# Patient Record
Sex: FEMALE | Race: WHITE | NOT HISPANIC OR LATINO
[De-identification: names, ages, dates, MRNs, and addresses within clinical notes are randomized per-mention and may not be internally consistent; named-entity substitution may affect disease eponyms.]

---

## 2017-01-03 ENCOUNTER — APPOINTMENT (OUTPATIENT)
Dept: CARDIOLOGY | Facility: CLINIC | Age: 62
End: 2017-01-03

## 2017-01-03 ENCOUNTER — NON-APPOINTMENT (OUTPATIENT)
Age: 62
End: 2017-01-03

## 2017-01-03 VITALS
OXYGEN SATURATION: 100 % | DIASTOLIC BLOOD PRESSURE: 69 MMHG | SYSTOLIC BLOOD PRESSURE: 109 MMHG | HEART RATE: 58 BPM | BODY MASS INDEX: 22.32 KG/M2 | WEIGHT: 126 LBS

## 2017-02-02 ENCOUNTER — APPOINTMENT (OUTPATIENT)
Dept: VASCULAR SURGERY | Facility: CLINIC | Age: 62
End: 2017-02-02

## 2017-02-02 VITALS — SYSTOLIC BLOOD PRESSURE: 131 MMHG | HEART RATE: 64 BPM | DIASTOLIC BLOOD PRESSURE: 81 MMHG

## 2017-02-02 VITALS — TEMPERATURE: 98.3 F | RESPIRATION RATE: 16 BRPM

## 2017-02-02 VITALS — HEIGHT: 63 IN | BODY MASS INDEX: 22.32 KG/M2 | WEIGHT: 126 LBS

## 2017-02-02 DIAGNOSIS — I72.4 ANEURYSM OF ARTERY OF LOWER EXTREMITY: ICD-10-CM

## 2017-03-18 ENCOUNTER — RX RENEWAL (OUTPATIENT)
Age: 62
End: 2017-03-18

## 2017-03-24 ENCOUNTER — APPOINTMENT (OUTPATIENT)
Dept: MRI IMAGING | Facility: CLINIC | Age: 62
End: 2017-03-24

## 2017-04-10 ENCOUNTER — MEDICATION RENEWAL (OUTPATIENT)
Age: 62
End: 2017-04-10

## 2017-05-24 ENCOUNTER — RX RENEWAL (OUTPATIENT)
Age: 62
End: 2017-05-24

## 2017-05-31 ENCOUNTER — MEDICATION RENEWAL (OUTPATIENT)
Age: 62
End: 2017-05-31

## 2017-06-30 ENCOUNTER — MEDICATION RENEWAL (OUTPATIENT)
Age: 62
End: 2017-06-30

## 2017-07-03 ENCOUNTER — RX RENEWAL (OUTPATIENT)
Age: 62
End: 2017-07-03

## 2017-07-12 ENCOUNTER — MEDICATION RENEWAL (OUTPATIENT)
Age: 62
End: 2017-07-12
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
## 2017-07-17 ENCOUNTER — APPOINTMENT (OUTPATIENT)
Dept: CARDIOLOGY | Facility: CLINIC | Age: 62
End: 2017-07-17

## 2017-07-17 ENCOUNTER — NON-APPOINTMENT (OUTPATIENT)
Age: 62
End: 2017-07-17

## 2017-07-17 ENCOUNTER — APPOINTMENT (OUTPATIENT)
Dept: CARDIOLOGY | Facility: CLINIC | Age: 62
End: 2017-07-17
Payer: COMMERCIAL

## 2017-07-17 VITALS — OXYGEN SATURATION: 98 % | HEART RATE: 61 BPM | SYSTOLIC BLOOD PRESSURE: 122 MMHG | DIASTOLIC BLOOD PRESSURE: 70 MMHG

## 2017-07-17 VITALS — WEIGHT: 129 LBS | HEIGHT: 63 IN | BODY MASS INDEX: 22.86 KG/M2
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
## 2017-07-17 PROCEDURE — 93000 ELECTROCARDIOGRAM COMPLETE: CPT

## 2017-07-17 PROCEDURE — 99214 OFFICE O/P EST MOD 30 MIN: CPT

## 2017-08-30 ENCOUNTER — OTHER (OUTPATIENT)
Age: 62
End: 2017-08-30

## 2017-09-20 ENCOUNTER — NON-APPOINTMENT (OUTPATIENT)
Age: 62
End: 2017-09-20

## 2017-09-20 ENCOUNTER — APPOINTMENT (OUTPATIENT)
Dept: CARDIOLOGY | Facility: CLINIC | Age: 62
End: 2017-09-20
Payer: COMMERCIAL

## 2017-09-20 VITALS
BODY MASS INDEX: 23.21 KG/M2 | DIASTOLIC BLOOD PRESSURE: 79 MMHG | OXYGEN SATURATION: 96 % | WEIGHT: 131 LBS | HEART RATE: 63 BPM | SYSTOLIC BLOOD PRESSURE: 124 MMHG

## 2017-09-20 DIAGNOSIS — I34.0 NONRHEUMATIC MITRAL (VALVE) INSUFFICIENCY: ICD-10-CM

## 2017-09-20 DIAGNOSIS — I10 ESSENTIAL (PRIMARY) HYPERTENSION: ICD-10-CM

## 2017-09-20 DIAGNOSIS — I35.1 NONRHEUMATIC AORTIC (VALVE) INSUFFICIENCY: ICD-10-CM

## 2017-09-20 DIAGNOSIS — R06.09 OTHER FORMS OF DYSPNEA: ICD-10-CM

## 2017-09-20 PROCEDURE — 99215 OFFICE O/P EST HI 40 MIN: CPT

## 2017-09-20 PROCEDURE — 93000 ELECTROCARDIOGRAM COMPLETE: CPT

## 2018-07-09 ENCOUNTER — TRANSCRIPTION ENCOUNTER (OUTPATIENT)
Age: 63
End: 2018-07-09

## 2018-08-05 ENCOUNTER — TRANSCRIPTION ENCOUNTER (OUTPATIENT)
Age: 63
End: 2018-08-05

## 2018-08-07 ENCOUNTER — RX RENEWAL (OUTPATIENT)
Age: 63
End: 2018-08-07

## 2018-11-08 ENCOUNTER — RX RENEWAL (OUTPATIENT)
Age: 63
End: 2018-11-08

## 2020-07-19 ENCOUNTER — TRANSCRIPTION ENCOUNTER (OUTPATIENT)
Age: 65
End: 2020-07-19

## 2020-08-06 ENCOUNTER — TRANSCRIPTION ENCOUNTER (OUTPATIENT)
Age: 65
End: 2020-08-06

## 2023-05-27 ENCOUNTER — NON-APPOINTMENT (OUTPATIENT)
Age: 68
End: 2023-05-27

## 2023-07-11 ENCOUNTER — NON-APPOINTMENT (OUTPATIENT)
Age: 68
End: 2023-07-11

## 2023-07-17 DIAGNOSIS — D17.21 BENIGN LIPOMATOUS NEOPLASM OF SKIN AND SUBCUTANEOUS TISSUE OF RIGHT ARM: ICD-10-CM

## 2023-07-17 DIAGNOSIS — T14.8XXA OTHER INJURY OF UNSPECIFIED BODY REGION, INITIAL ENCOUNTER: ICD-10-CM

## 2023-07-17 DIAGNOSIS — I67.1 CEREBRAL ANEURYSM, NONRUPTURED: ICD-10-CM

## 2023-07-17 DIAGNOSIS — Z86.59 PERSONAL HISTORY OF OTHER MENTAL AND BEHAVIORAL DISORDERS: ICD-10-CM

## 2023-07-18 ENCOUNTER — APPOINTMENT (OUTPATIENT)
Dept: VASCULAR SURGERY | Facility: CLINIC | Age: 68
End: 2023-07-18
Payer: MEDICARE

## 2023-07-18 VITALS
HEIGHT: 63 IN | SYSTOLIC BLOOD PRESSURE: 122 MMHG | BODY MASS INDEX: 24.88 KG/M2 | DIASTOLIC BLOOD PRESSURE: 70 MMHG | WEIGHT: 140.38 LBS

## 2023-07-18 DIAGNOSIS — J45.909 UNSPECIFIED ASTHMA, UNCOMPLICATED: ICD-10-CM

## 2023-07-18 DIAGNOSIS — Z98.890 OTHER SPECIFIED POSTPROCEDURAL STATES: ICD-10-CM

## 2023-07-18 DIAGNOSIS — I72.9 ANEURYSM OF UNSPECIFIED SITE: ICD-10-CM

## 2023-07-18 DIAGNOSIS — Z78.9 OTHER SPECIFIED HEALTH STATUS: ICD-10-CM

## 2023-07-18 DIAGNOSIS — Z87.2 PERSONAL HISTORY OF DISEASES OF THE SKIN AND SUBCUTANEOUS TISSUE: ICD-10-CM

## 2023-07-18 DIAGNOSIS — Z87.898 PERSONAL HISTORY OF OTHER SPECIFIED CONDITIONS: ICD-10-CM

## 2023-07-18 DIAGNOSIS — Z85.89 PERSONAL HISTORY OF MALIGNANT NEOPLASM OF OTHER ORGANS AND SYSTEMS: ICD-10-CM

## 2023-07-18 DIAGNOSIS — Z81.8 FAMILY HISTORY OF OTHER MENTAL AND BEHAVIORAL DISORDERS: ICD-10-CM

## 2023-07-18 DIAGNOSIS — Z80.8 FAMILY HISTORY OF MALIGNANT NEOPLASM OF OTHER ORGANS OR SYSTEMS: ICD-10-CM

## 2023-07-18 DIAGNOSIS — Z83.511 FAMILY HISTORY OF GLAUCOMA: ICD-10-CM

## 2023-07-18 DIAGNOSIS — Z80.42 FAMILY HISTORY OF MALIGNANT NEOPLASM OF PROSTATE: ICD-10-CM

## 2023-07-18 DIAGNOSIS — Z85.828 PERSONAL HISTORY OF OTHER MALIGNANT NEOPLASM OF SKIN: ICD-10-CM

## 2023-07-18 DIAGNOSIS — D17.30 BENIGN LIPOMATOUS NEOPLASM OF SKIN AND SUBCUTANEOUS TISSUE OF UNSPECIFIED SITES: ICD-10-CM

## 2023-07-18 DIAGNOSIS — Z82.49 FAMILY HISTORY OF ISCHEMIC HEART DISEASE AND OTHER DISEASES OF THE CIRCULATORY SYSTEM: ICD-10-CM

## 2023-07-18 DIAGNOSIS — Z81.3 FAMILY HISTORY OF OTHER PSYCHOACTIVE SUBSTANCE ABUSE AND DEPENDENCE: ICD-10-CM

## 2023-07-18 PROCEDURE — 99203 OFFICE O/P NEW LOW 30 MIN: CPT

## 2023-07-18 RX ORDER — ALCLOMETASONE DIPROPIONATE 0.5 MG/G
0.05 OINTMENT TOPICAL
Qty: 15 | Refills: 0 | Status: ACTIVE | COMMUNITY
Start: 2017-07-26

## 2023-07-18 RX ORDER — SERTRALINE HYDROCHLORIDE 50 MG/1
50 TABLET, FILM COATED ORAL DAILY
Refills: 0 | Status: ACTIVE | COMMUNITY
Start: 2017-07-03

## 2023-07-18 RX ORDER — TRAZODONE HYDROCHLORIDE 50 MG/1
50 TABLET ORAL
Refills: 0 | Status: ACTIVE | COMMUNITY

## 2023-07-18 RX ORDER — DOXYCYCLINE HYCLATE 100 MG/1
100 CAPSULE ORAL
Qty: 42 | Refills: 0 | Status: COMPLETED | COMMUNITY
Start: 2017-07-12 | End: 2023-07-18

## 2023-07-18 RX ORDER — LITHIUM CARBONATE 300 MG/1
TABLET ORAL
Refills: 0 | Status: ACTIVE | COMMUNITY

## 2023-07-18 RX ORDER — BUDESONIDE AND FORMOTEROL FUMARATE DIHYDRATE 160; 4.5 UG/1; UG/1
AEROSOL RESPIRATORY (INHALATION)
Refills: 0 | Status: ACTIVE | COMMUNITY

## 2023-07-18 RX ORDER — TRAZODONE HYDROCHLORIDE 50 MG/1
50 TABLET ORAL
Qty: 90 | Refills: 0 | Status: COMPLETED | COMMUNITY
Start: 2017-06-08 | End: 2023-07-18

## 2023-07-18 RX ORDER — METOPROLOL TARTRATE 75 MG/1
TABLET, FILM COATED ORAL
Refills: 0 | Status: ACTIVE | COMMUNITY

## 2023-07-18 RX ORDER — HALOBETASOL PROPIONATE 0.5 MG/G
0.05 OINTMENT TOPICAL
Qty: 15 | Refills: 0 | Status: COMPLETED | COMMUNITY
Start: 2017-07-26 | End: 2023-07-18

## 2023-07-18 RX ORDER — TRAZODONE HYDROCHLORIDE 150 MG/1
150 TABLET ORAL
Qty: 30 | Refills: 0 | Status: COMPLETED | COMMUNITY
Start: 2017-03-18 | End: 2023-07-18

## 2023-07-18 RX ORDER — PANTOPRAZOLE 40 MG/1
40 TABLET, DELAYED RELEASE ORAL
Qty: 90 | Refills: 0 | Status: COMPLETED | COMMUNITY
Start: 2017-06-27 | End: 2023-07-18

## 2023-07-18 RX ORDER — FLUTICASONE PROPIONATE 50 UG/1
50 SPRAY, METERED NASAL
Qty: 16 | Refills: 0 | Status: ACTIVE | COMMUNITY
Start: 2017-02-09

## 2023-07-18 RX ORDER — GABAPENTIN 100 MG/1
100 CAPSULE ORAL
Qty: 90 | Refills: 0 | Status: COMPLETED | COMMUNITY
Start: 2017-07-06 | End: 2023-07-18

## 2023-07-18 NOTE — PHYSICAL EXAM
[Normal Breath Sounds] : Normal breath sounds [Normal Heart Sounds] : normal heart sounds [de-identified] : NAD [de-identified] : ADELA [de-identified] : flat, soft, mobile 1 cm mass on the R forearm, feels slightly deeper than the subcutaneous tissue, possibly within the fascia of the arm.  Nontender. No pores visible.

## 2023-07-18 NOTE — REVIEW OF SYSTEMS
[Negative] : Heme/Lymph [de-identified] : on aspirin but denies prolonged bleeding or easy bruising.

## 2023-07-18 NOTE — HISTORY OF PRESENT ILLNESS
[de-identified] : 66 yo female with history of brain aneurysm, presents for evaluation of a lump on her right arm.  Two weeks ago she bruised her arm in that location and developed a substantial amount of eccymosis on the forearm.  Denies blood thinners, only aspirin.  The eccymosis has mostly resolved but she noticed a flat mass under the skin of the forearm and she presents for evaluation of it.  \par \par She denies pain, parasthesias and she is certain it was not present prior to the injury

## 2023-07-18 NOTE — ASSESSMENT
[FreeTextEntry1] : Most likely a small lipoma on the forearm, could be a cyst.  Asymptomatic.  Recommended observation unless it becomes bothersome.

## 2023-07-24 ENCOUNTER — APPOINTMENT (OUTPATIENT)
Dept: ORTHOPEDIC SURGERY | Facility: CLINIC | Age: 68
End: 2023-07-24
Payer: MEDICARE

## 2023-07-24 VITALS — WEIGHT: 140.38 LBS | BODY MASS INDEX: 24.88 KG/M2 | HEIGHT: 63 IN

## 2023-07-24 PROCEDURE — 99204 OFFICE O/P NEW MOD 45 MIN: CPT

## 2023-07-24 PROCEDURE — 99214 OFFICE O/P EST MOD 30 MIN: CPT

## 2023-07-24 NOTE — HISTORY OF PRESENT ILLNESS
[Neck] : neck [Gradual] : gradual [3] : 3 [5] : 5 [Dull/Aching] : dull/aching [Throbbing] : throbbing [Tingling] : tingling [Constant] : constant [Retired] : Work status: retired [de-identified] : Patient presents today with neck pain for years with NKI. Previously completed PT and had ESIs. States her pain radiates down the left arm, has tingling. States she has cubital tunnel. States her neck feels stiff. States she has extreme balance issues, has passed out twice and has fallen twice this year. Admits taking Extra Strength Tylenol PRN for pain. States she had a cervical spine MRI at [facility] in [place], has no disc or report.  [] : no [FreeTextEntry7] : down the left arm [FreeTextEntry9] : not lying on her left side [de-identified] : lying on her left side, reaching forward and cross body, lifting [de-identified] : cervical spine MRI at [facility] in [place]

## 2023-07-24 NOTE — ASSESSMENT
[FreeTextEntry1] : Patient had MRI done, will obtain report and give to us.  Will check if she has CD with images \par \par Patient given prescription for EMG/NCS, follow up after study is completed to discuss results. \par \par Patient will begin physical therapy. \par \par Recommend: - NSAID - Heating pad - Muscle relaxer - Neck stretching exercise - Soft cervical collar - Cervical traction Patient is given neck rehabilitation exercise book.

## 2023-08-15 ENCOUNTER — APPOINTMENT (OUTPATIENT)
Dept: NEUROLOGY | Facility: CLINIC | Age: 68
End: 2023-08-15

## 2023-08-16 ENCOUNTER — RESULT REVIEW (OUTPATIENT)
Age: 68
End: 2023-08-16

## 2023-08-23 ENCOUNTER — TRANSCRIPTION ENCOUNTER (OUTPATIENT)
Age: 68
End: 2023-08-23

## 2023-08-31 ENCOUNTER — APPOINTMENT (OUTPATIENT)
Dept: ORTHOPEDIC SURGERY | Facility: CLINIC | Age: 68
End: 2023-08-31
Payer: MEDICARE

## 2023-08-31 VITALS — HEIGHT: 63 IN | WEIGHT: 140.38 LBS | BODY MASS INDEX: 24.88 KG/M2

## 2023-08-31 DIAGNOSIS — M48.02 SPINAL STENOSIS, CERVICAL REGION: ICD-10-CM

## 2023-08-31 DIAGNOSIS — M62.838 OTHER MUSCLE SPASM: ICD-10-CM

## 2023-08-31 DIAGNOSIS — M50.220 OTHER CERVICAL DISC DISPLACEMENT, MID-CERVICAL REGION, UNSPECIFIED LEVEL: ICD-10-CM

## 2023-08-31 DIAGNOSIS — M47.812 SPONDYLOSIS W/OUT MYELOPATHY OR RADICULOPATHY, CERVICAL REGION: ICD-10-CM

## 2023-08-31 DIAGNOSIS — M47.22 OTHER SPONDYLOSIS WITH RADICULOPATHY, CERVICAL REGION: ICD-10-CM

## 2023-08-31 DIAGNOSIS — M54.12 RADICULOPATHY, CERVICAL REGION: ICD-10-CM

## 2023-08-31 PROCEDURE — 99214 OFFICE O/P EST MOD 30 MIN: CPT

## 2023-08-31 RX ORDER — METHYLPREDNISOLONE 4 MG/1
4 TABLET ORAL
Qty: 1 | Refills: 0 | Status: ACTIVE | COMMUNITY
Start: 2023-08-31 | End: 1900-01-01

## 2023-08-31 NOTE — DATA REVIEWED
[EMG Nerve Conduction] : A EMG Nerve Conduction test was completed of the [Positive] : positive [Consistent with peripheral neuropathy] : consistent with peripheral neuropathy [MRI] : MRI [Cervical Spine] : cervical spine [Report was reviewed and noted in the chart] : The report was reviewed and noted in the chart [I independently reviewed and interpreted images and report] : I independently reviewed and interpreted images and report [FreeTextEntry1] : MRI: DDD C5-7 HNP C5-7  Severe stenosis C5-6 Moderate stenosis C6-7  EMG: Right ulnar sensory neuropathy

## 2023-09-04 NOTE — HISTORY OF PRESENT ILLNESS
[Neck] : neck [Gradual] : gradual [3] : 3 [5] : 5 [Dull/Aching] : dull/aching [Throbbing] : throbbing [Tingling] : tingling [Constant] : constant [Retired] : Work status: retired [] : no [FreeTextEntry7] : down the left arm [FreeTextEntry9] : not lying on her left side [de-identified] : lying on her left side, reaching forward and cross body, lifting [de-identified] : cervical spine MRI at [facility] in [place] [de-identified] : Follow up cervical spine MRI Results at [facility] and EMG Results. PT? Admits taking Extra Strength Tylenol PRN for pain.   Today's Pain

## 2023-09-04 NOTE — HISTORY OF PRESENT ILLNESS
[Neck] : neck [Gradual] : gradual [3] : 3 [5] : 5 [Throbbing] : throbbing [Dull/Aching] : dull/aching [Tingling] : tingling [Constant] : constant [Retired] : Work status: retired [] : no [FreeTextEntry7] : down the left arm [FreeTextEntry9] : not lying on her left side [de-identified] : lying on her left side, reaching forward and cross body, lifting [de-identified] : cervical spine MRI at [facility] in [place] [de-identified] : Follow up cervical spine MRI Results at [facility] and EMG Results. PT? Admits taking Extra Strength Tylenol PRN for pain.   Today's Pain

## 2023-09-15 ENCOUNTER — NON-APPOINTMENT (OUTPATIENT)
Age: 68
End: 2023-09-15

## 2023-09-21 ENCOUNTER — APPOINTMENT (OUTPATIENT)
Dept: ORTHOPEDIC SURGERY | Facility: CLINIC | Age: 68
End: 2023-09-21

## 2023-09-22 ENCOUNTER — APPOINTMENT (OUTPATIENT)
Dept: ORTHOPEDIC SURGERY | Facility: CLINIC | Age: 68
End: 2023-09-22
Payer: MEDICARE

## 2023-09-22 DIAGNOSIS — G57.62 LESION OF PLANTAR NERVE, LEFT LOWER LIMB: ICD-10-CM

## 2023-09-22 DIAGNOSIS — S90.32XA CONTUSION OF LEFT FOOT, INITIAL ENCOUNTER: ICD-10-CM

## 2023-09-22 PROCEDURE — 99213 OFFICE O/P EST LOW 20 MIN: CPT

## 2023-09-22 PROCEDURE — L4361: CPT | Mod: KX,LT

## 2023-09-22 PROCEDURE — 99203 OFFICE O/P NEW LOW 30 MIN: CPT

## 2023-09-27 ENCOUNTER — RESULT REVIEW (OUTPATIENT)
Age: 68
End: 2023-09-27

## 2023-09-29 ENCOUNTER — APPOINTMENT (OUTPATIENT)
Dept: ORTHOPEDIC SURGERY | Facility: CLINIC | Age: 68
End: 2023-09-29
Payer: MEDICARE

## 2023-09-29 DIAGNOSIS — M84.376A STRESS FRACTURE, UNSPECIFIED FOOT, INITIAL ENCOUNTER FOR FRACTURE: ICD-10-CM

## 2023-09-29 DIAGNOSIS — M84.375G STRESS FRACTURE, LEFT FOOT, SUBSEQUENT ENCOUNTER FOR FRACTURE WITH DELAYED HEALING: ICD-10-CM

## 2023-09-29 PROCEDURE — 99214 OFFICE O/P EST MOD 30 MIN: CPT

## 2023-10-09 ENCOUNTER — APPOINTMENT (OUTPATIENT)
Dept: ORTHOPEDIC SURGERY | Facility: CLINIC | Age: 68
End: 2023-10-09

## 2024-06-20 ENCOUNTER — APPOINTMENT (OUTPATIENT)
Dept: ORTHOPEDIC SURGERY | Facility: CLINIC | Age: 69
End: 2024-06-20
Payer: MEDICARE

## 2024-06-20 VITALS — HEIGHT: 63 IN | BODY MASS INDEX: 23.57 KG/M2 | WEIGHT: 133 LBS

## 2024-06-20 DIAGNOSIS — M25.811 OTHER SPECIFIED JOINT DISORDERS, RIGHT SHOULDER: ICD-10-CM

## 2024-06-20 PROCEDURE — 99214 OFFICE O/P EST MOD 30 MIN: CPT

## 2024-06-20 PROCEDURE — 73060 X-RAY EXAM OF HUMERUS: CPT | Mod: RT

## 2024-06-20 NOTE — PHYSICAL EXAM
[de-identified] : Constitutional: The patient appears well developed, well nourished. Examination of patients ability to communicate functionally was normal.       Neurologic: Coordination is normal. Alert and oriented to time, place and person. No evidence of mood disorder, calm affect.           RIGHT  SHOULDER: Inspection of the shoulder/upper arm is as follows: no swelling, no erythema, no ecchymosis, no atrophy and no deformity.       Palpation of the shoulder/upper arm is as follows: Tenderness is noted at the anterior shoulder and lateral shoulder and bicipital groove       Range of motion of the shoulder is as follows in degrees:   Pain with internal rotation, external rotation, abduction, and forward flexion.       active forward flexion to: 170    active abduction to: 170     internal rotation to: T10    external rotation with arm to side: 50      Strength of the shoulder is as follows:       Forward flexion 5/5   Abduction 5/5   External Rotation 5/5     Internal Rotation 5/5       Ligament Stability and Special Tests of the shoulder is as follows: Impingement testing is positive. Hawkin's testing is positive. There is positive arc of pain. Shoulder apprehension shows to be negative. Shoulder relocation is negative. Drop-arm testing is negative.       Neurological testing of the shoulder is as follows: reflexes intact, No sensory deficits, motor and sensor intact distally and no scapular winging.     [Right] : right shoulder [There are no fractures, subluxations or dislocations. No significant abnormalities are seen] : There are no fractures, subluxations or dislocations. No significant abnormalities are seen

## 2024-06-20 NOTE — HISTORY OF PRESENT ILLNESS
[de-identified] : Patient is here today for the right humerus and c/o pain between the shoulder and elbow pain for the past few mos.  She denies any injury. Pain is into the shoulder radiating to the upper arm.  She notes pain with overhead motion and at night. Denies any RUE paresthesias.  She likes to avoid NSAIDS due to her Lithium usage. She has tried tyl

## 2024-06-20 NOTE — DISCUSSION/SUMMARY
[de-identified] : Extensive discussion of the options was had with the patient. This discussion included both surgical and nonsurgical options. Options including but not limited to cortisone injection, visco-supplementation, physical therapy, oral anti-inflammatories were discussed with the patient. We also discussed the option of observation, allowing the patient to continue rest, ice, prescription drug NSAIDs and following up if the condition does not improve. Time was taken to go over any questions the patient had in regard to any of the treatment plans described. Plan is for CSI Right shoulder subacromial and PT. She will  f/u in 1 mos.

## 2024-07-29 ENCOUNTER — APPOINTMENT (OUTPATIENT)
Dept: ORTHOPEDIC SURGERY | Facility: CLINIC | Age: 69
End: 2024-07-29
Payer: MEDICARE

## 2024-07-29 DIAGNOSIS — M47.22 OTHER SPONDYLOSIS WITH RADICULOPATHY, CERVICAL REGION: ICD-10-CM

## 2024-07-29 DIAGNOSIS — M62.838 OTHER MUSCLE SPASM: ICD-10-CM

## 2024-07-29 DIAGNOSIS — M54.12 RADICULOPATHY, CERVICAL REGION: ICD-10-CM

## 2024-07-29 DIAGNOSIS — M48.02 SPINAL STENOSIS, CERVICAL REGION: ICD-10-CM

## 2024-07-29 DIAGNOSIS — M50.220 OTHER CERVICAL DISC DISPLACEMENT, MID-CERVICAL REGION, UNSPECIFIED LEVEL: ICD-10-CM

## 2024-07-29 DIAGNOSIS — M47.812 SPONDYLOSIS W/OUT MYELOPATHY OR RADICULOPATHY, CERVICAL REGION: ICD-10-CM

## 2024-07-29 PROCEDURE — 99214 OFFICE O/P EST MOD 30 MIN: CPT

## 2024-07-29 RX ORDER — BUTALBITAL, ACETAMINOPHEN AND CAFFEINE 325; 50; 40 MG/1; MG/1; MG/1
50-325-40 TABLET ORAL
Qty: 15 | Refills: 0 | Status: ACTIVE | COMMUNITY
Start: 2024-05-06

## 2024-07-29 RX ORDER — LITHIUM CARBONATE 300 MG/1
300 TABLET, FILM COATED, EXTENDED RELEASE ORAL
Qty: 90 | Refills: 0 | Status: ACTIVE | COMMUNITY
Start: 2024-04-04

## 2024-07-29 RX ORDER — TRAZODONE HYDROCHLORIDE 100 MG/1
100 TABLET ORAL
Qty: 90 | Refills: 0 | Status: ACTIVE | COMMUNITY
Start: 2024-02-05

## 2024-07-29 RX ORDER — LISINOPRIL 2.5 MG/1
2.5 TABLET ORAL
Refills: 0 | Status: ACTIVE | COMMUNITY

## 2024-07-29 NOTE — ASSESSMENT
[FreeTextEntry1] : MRI: DDD C5-7 HNP C5-7  Severe stenosis C5-6 Moderate stenosis C6-7  EMG: Right ulnar sensory neuropathy  67 yo female presents today for eval of her neck pain. Patient had OSKAR x2 since visit last August with good relief in radiculopathy but with persistent neck pain. I recommend proceeding with new MRI for further eval of progression of stenosis.   - Patient given prescription for MRI, follow up after study is completed to discuss results.   - Recommend physical therapy to regain range of motion, strengthening and symptomatic improvement. Prescription given in office today.   - Recommend NSAIDs PRN - Recommend heating pad use to decrease muscle spasm - Discussed the importance of home exercises, including but not limited to neck stretching and cervical traction   Patient was educated on their diagnosis today. All questions answered and patient expressed understanding.  Follow up after MRI

## 2024-07-29 NOTE — HISTORY OF PRESENT ILLNESS
[de-identified] : Follow up cervical spine. Patient states she has had 2 injections with significant improvement. Patient states she feels sore but no longer has pain down her left arm. Patient states she has clicking and cracking. Patient states she completed PT in [place]. Patient admits to going to PT 2x a week with some relief. Patient admits to using TENs unit. Patient admits to taking Extra Strength Tylenol PRN for pain.   Today's Pain 2/10

## 2024-08-06 ENCOUNTER — RESULT REVIEW (OUTPATIENT)
Age: 69
End: 2024-08-06

## 2024-08-15 ENCOUNTER — APPOINTMENT (OUTPATIENT)
Dept: ORTHOPEDIC SURGERY | Facility: CLINIC | Age: 69
End: 2024-08-15
Payer: MEDICARE

## 2024-08-15 DIAGNOSIS — M25.811 OTHER SPECIFIED JOINT DISORDERS, RIGHT SHOULDER: ICD-10-CM

## 2024-08-15 PROCEDURE — 99214 OFFICE O/P EST MOD 30 MIN: CPT

## 2024-08-16 ENCOUNTER — RESULT REVIEW (OUTPATIENT)
Age: 69
End: 2024-08-16

## 2024-08-19 NOTE — HISTORY OF PRESENT ILLNESS
[de-identified] : Patient is here for a follow up on right arm. Patient had a CSI in right shoulder subacromial 1 month ago.  Patient states she had approx 3 weeks of relief.  She states the pain has returned and her pain is in the upper arm.  She notes pain worse at night and she notes pain when leaning on it.  She has difficulty lifting her arm.

## 2024-08-19 NOTE — DISCUSSION/SUMMARY
[de-identified] : Extensive discussion of the options was had with the patient. This discussion included both surgical and nonsurgical options. Options including but not limited to cortisone injection, visco-supplementation, physical therapy, prescription oral anti-inflammatories and MRI were discussed with the patient. We also discussed the option of observation, allowing the patient to continue rest, ice and following up if the condition does not improve. Time was taken to go over any questions the patient had in regard to any of the treatment plans described. I have independently reviewed all previous imaging as well as imaging reports which showed spondylosis and facet arthrosis of the cervical spine. The information was thoroughly discussed and reviewed with the patient as well. All questions have been answered and the patient is in agreement with the plan proceeding.  I advised the patient that they can take NSAIDS OTC including 2 tabs of Naproxen 225mg BID. The patient was advised that the NSAID should be taken with food. Time was taken to discuss the importance of proper prescription drug management. Patient was advised that they should continue the Rx for the full two weeks even if their symptoms dissipate. The patient was also warned of potential risks/side effects of the medication. These potential risks include bruising, gastrointestinal bleed, gastrointestinal burning, allergic reaction and reduced blood clotting. The patient expressed verbal understanding but would like to refrain at this time and is interested in an MRI.  Patient was given Rx for MRI of the right shoulder to further evaluate for any ligamentous, muscle and cartilaginous injury that is suspected due to physical examination and patients' description of pain, clicking, and feelings of instability and/or weakness. Patient was instructed to f/u after imaging for review.  The following information has been documented by Allyssa Vela acting as a scribe. The documentation recorded by the scribe, in my presence, accurately reflects the service I, Dr. Gaytan, personally performed, and the decisions made by me with my edits as appropriate.

## 2024-08-19 NOTE — HISTORY OF PRESENT ILLNESS
[de-identified] : Patient is here for a follow up on right arm. Patient had a CSI in right shoulder subacromial 1 month ago.  Patient states she had approx 3 weeks of relief.  She states the pain has returned and her pain is in the upper arm.  She notes pain worse at night and she notes pain when leaning on it.  She has difficulty lifting her arm.

## 2024-08-19 NOTE — PHYSICAL EXAM
[de-identified] : Constitutional: The patient appears well developed, well nourished. Examination of patients ability to communicate functionally was normal.       Neurologic: Coordination is normal. Alert and oriented to time, place and person. No evidence of mood disorder, calm affect.           RIGHT  SHOULDER: Inspection of the shoulder/upper arm is as follows: no swelling, no erythema, no ecchymosis, no atrophy and no deformity.       Palpation of the shoulder/upper arm is as follows: Tenderness is noted at the anterior shoulder and lateral shoulder and bicipital groove       Range of motion of the shoulder is as follows in degrees:   Pain with internal rotation, external rotation, abduction, and forward flexion.       active forward flexion to: 170    active abduction to: 170     internal rotation to: T10    external rotation with arm to side: 50      Strength of the shoulder is as follows:       Forward flexion 5/5   Abduction 5/5   External Rotation 5/5     Internal Rotation 5/5       Ligament Stability and Special Tests of the shoulder is as follows: Impingement testing is positive. Hawkin's testing is positive. There is positive arc of pain. Shoulder apprehension shows to be negative. Shoulder relocation is negative. Drop-arm testing is negative.       Neurological testing of the shoulder is as follows: reflexes intact, No sensory deficits, motor and sensor intact distally and no scapular winging.

## 2024-08-19 NOTE — DISCUSSION/SUMMARY
[de-identified] : Extensive discussion of the options was had with the patient. This discussion included both surgical and nonsurgical options. Options including but not limited to cortisone injection, visco-supplementation, physical therapy, prescription oral anti-inflammatories and MRI were discussed with the patient. We also discussed the option of observation, allowing the patient to continue rest, ice and following up if the condition does not improve. Time was taken to go over any questions the patient had in regard to any of the treatment plans described. I have independently reviewed all previous imaging as well as imaging reports which showed spondylosis and facet arthrosis of the cervical spine. The information was thoroughly discussed and reviewed with the patient as well. All questions have been answered and the patient is in agreement with the plan proceeding.  I advised the patient that they can take NSAIDS OTC including 2 tabs of Naproxen 225mg BID. The patient was advised that the NSAID should be taken with food. Time was taken to discuss the importance of proper prescription drug management. Patient was advised that they should continue the Rx for the full two weeks even if their symptoms dissipate. The patient was also warned of potential risks/side effects of the medication. These potential risks include bruising, gastrointestinal bleed, gastrointestinal burning, allergic reaction and reduced blood clotting. The patient expressed verbal understanding but would like to refrain at this time and is interested in an MRI.  Patient was given Rx for MRI of the right shoulder to further evaluate for any ligamentous, muscle and cartilaginous injury that is suspected due to physical examination and patients' description of pain, clicking, and feelings of instability and/or weakness. Patient was instructed to f/u after imaging for review.  The following information has been documented by Allyssa Vela acting as a scribe. The documentation recorded by the scribe, in my presence, accurately reflects the service I, Dr. Gaytan, personally performed, and the decisions made by me with my edits as appropriate.

## 2024-08-19 NOTE — PHYSICAL EXAM
[de-identified] : Constitutional: The patient appears well developed, well nourished. Examination of patients ability to communicate functionally was normal.       Neurologic: Coordination is normal. Alert and oriented to time, place and person. No evidence of mood disorder, calm affect.           RIGHT  SHOULDER: Inspection of the shoulder/upper arm is as follows: no swelling, no erythema, no ecchymosis, no atrophy and no deformity.       Palpation of the shoulder/upper arm is as follows: Tenderness is noted at the anterior shoulder and lateral shoulder and bicipital groove       Range of motion of the shoulder is as follows in degrees:   Pain with internal rotation, external rotation, abduction, and forward flexion.       active forward flexion to: 170    active abduction to: 170     internal rotation to: T10    external rotation with arm to side: 50      Strength of the shoulder is as follows:       Forward flexion 5/5   Abduction 5/5   External Rotation 5/5     Internal Rotation 5/5       Ligament Stability and Special Tests of the shoulder is as follows: Impingement testing is positive. Hawkin's testing is positive. There is positive arc of pain. Shoulder apprehension shows to be negative. Shoulder relocation is negative. Drop-arm testing is negative.       Neurological testing of the shoulder is as follows: reflexes intact, No sensory deficits, motor and sensor intact distally and no scapular winging.

## 2024-08-19 NOTE — DISCUSSION/SUMMARY
[de-identified] : Extensive discussion of the options was had with the patient. This discussion included both surgical and nonsurgical options. Options including but not limited to cortisone injection, visco-supplementation, physical therapy, prescription oral anti-inflammatories and MRI were discussed with the patient. We also discussed the option of observation, allowing the patient to continue rest, ice and following up if the condition does not improve. Time was taken to go over any questions the patient had in regard to any of the treatment plans described. I have independently reviewed all previous imaging as well as imaging reports which showed spondylosis and facet arthrosis of the cervical spine. The information was thoroughly discussed and reviewed with the patient as well. All questions have been answered and the patient is in agreement with the plan proceeding.  I advised the patient that they can take NSAIDS OTC including 2 tabs of Naproxen 225mg BID. The patient was advised that the NSAID should be taken with food. Time was taken to discuss the importance of proper prescription drug management. Patient was advised that they should continue the Rx for the full two weeks even if their symptoms dissipate. The patient was also warned of potential risks/side effects of the medication. These potential risks include bruising, gastrointestinal bleed, gastrointestinal burning, allergic reaction and reduced blood clotting. The patient expressed verbal understanding but would like to refrain at this time and is interested in an MRI.  Patient was given Rx for MRI of the right shoulder to further evaluate for any ligamentous, muscle and cartilaginous injury that is suspected due to physical examination and patients' description of pain, clicking, and feelings of instability and/or weakness. Patient was instructed to f/u after imaging for review.  The following information has been documented by Allyssa Vela acting as a scribe. The documentation recorded by the scribe, in my presence, accurately reflects the service I, Dr. Gaytan, personally performed, and the decisions made by me with my edits as appropriate.

## 2024-08-19 NOTE — PHYSICAL EXAM
[de-identified] : Constitutional: The patient appears well developed, well nourished. Examination of patients ability to communicate functionally was normal.       Neurologic: Coordination is normal. Alert and oriented to time, place and person. No evidence of mood disorder, calm affect.           RIGHT  SHOULDER: Inspection of the shoulder/upper arm is as follows: no swelling, no erythema, no ecchymosis, no atrophy and no deformity.       Palpation of the shoulder/upper arm is as follows: Tenderness is noted at the anterior shoulder and lateral shoulder and bicipital groove       Range of motion of the shoulder is as follows in degrees:   Pain with internal rotation, external rotation, abduction, and forward flexion.       active forward flexion to: 170    active abduction to: 170     internal rotation to: T10    external rotation with arm to side: 50      Strength of the shoulder is as follows:       Forward flexion 5/5   Abduction 5/5   External Rotation 5/5     Internal Rotation 5/5       Ligament Stability and Special Tests of the shoulder is as follows: Impingement testing is positive. Hawkin's testing is positive. There is positive arc of pain. Shoulder apprehension shows to be negative. Shoulder relocation is negative. Drop-arm testing is negative.       Neurological testing of the shoulder is as follows: reflexes intact, No sensory deficits, motor and sensor intact distally and no scapular winging.

## 2024-09-05 ENCOUNTER — APPOINTMENT (OUTPATIENT)
Dept: ORTHOPEDIC SURGERY | Facility: CLINIC | Age: 69
End: 2024-09-05
Payer: MEDICARE

## 2024-09-05 DIAGNOSIS — M54.12 RADICULOPATHY, CERVICAL REGION: ICD-10-CM

## 2024-09-05 DIAGNOSIS — M48.02 SPINAL STENOSIS, CERVICAL REGION: ICD-10-CM

## 2024-09-05 DIAGNOSIS — M47.812 SPONDYLOSIS W/OUT MYELOPATHY OR RADICULOPATHY, CERVICAL REGION: ICD-10-CM

## 2024-09-05 DIAGNOSIS — M50.220 OTHER CERVICAL DISC DISPLACEMENT, MID-CERVICAL REGION, UNSPECIFIED LEVEL: ICD-10-CM

## 2024-09-05 DIAGNOSIS — M47.22 OTHER SPONDYLOSIS WITH RADICULOPATHY, CERVICAL REGION: ICD-10-CM

## 2024-09-05 DIAGNOSIS — M62.838 OTHER MUSCLE SPASM: ICD-10-CM

## 2024-09-05 PROCEDURE — 99214 OFFICE O/P EST MOD 30 MIN: CPT

## 2024-09-05 NOTE — DATA REVIEWED
[MRI] : MRI [Cervical Spine] : cervical spine [I independently reviewed and interpreted images and report] : I independently reviewed and interpreted images and report [FreeTextEntry1] : 8/2024 MRI of C-Spine was reviewed today and is as follows:  DDD C5-7 HNP with severe stenosis C5-6 HNP with moderate to severe stenosis C6-7

## 2024-09-05 NOTE — ASSESSMENT
[FreeTextEntry1] : 8/2024 MRI of C-Spine was reviewed today and is as follows:  DDD C5-7 HNP with severe stenosis C5-6 HNP with moderate to severe stenosis C6-7   EMG: Right ulnar sensory neuropathy  67 yo female presents today for eval of her neck pain. MRI detailed above consistent with last study. Patient with combination of shoulder issues on the R and C5-6 pathology. I discussed with patient that she may proceed with OSKAR in her neck first and see how much relief.   - Referral to pain management for cervical OSKAR, follow up 2 weeks after injection.   - Continue physical therapy to regain range of motion, strengthening and symptomatic improvement. Prescription given in office today.   - Recommend NSAIDs PRN - Recommend heating pad use to decrease muscle spasm - Discussed the importance of home exercises, including but not limited to neck stretching and cervical traction   Patient was educated on their diagnosis today. All questions answered and patient expressed understanding.  Follow up in 2 months

## 2024-09-05 NOTE — HISTORY OF PRESENT ILLNESS
[de-identified] : Body part: neck Better/ Worse/ Same since last visit: better Treatments since last visit: cervical spine MRI at [facility], PT 2x a week  Difficulty with:  Radicular symptoms: into the right shoulder and down the arm Paresthesia: none Current medications for pain: Extra Strength Tylenol PRN  Assistive walking device: none  Today's Pain: 2/10

## 2024-09-09 ENCOUNTER — APPOINTMENT (OUTPATIENT)
Dept: ORTHOPEDIC SURGERY | Facility: CLINIC | Age: 69
End: 2024-09-09

## 2024-09-20 ENCOUNTER — APPOINTMENT (OUTPATIENT)
Dept: PHYSICAL MEDICINE AND REHAB | Facility: CLINIC | Age: 69
End: 2024-09-20

## 2024-09-23 ENCOUNTER — APPOINTMENT (OUTPATIENT)
Dept: ORTHOPEDIC SURGERY | Facility: CLINIC | Age: 69
End: 2024-09-23

## 2024-10-03 ENCOUNTER — APPOINTMENT (OUTPATIENT)
Dept: ORTHOPEDIC SURGERY | Facility: CLINIC | Age: 69
End: 2024-10-03

## 2024-10-14 ENCOUNTER — APPOINTMENT (OUTPATIENT)
Dept: ORTHOPEDIC SURGERY | Facility: CLINIC | Age: 69
End: 2024-10-14

## 2024-10-14 ENCOUNTER — NON-APPOINTMENT (OUTPATIENT)
Age: 69
End: 2024-10-14

## 2025-07-17 ENCOUNTER — NON-APPOINTMENT (OUTPATIENT)
Age: 70
End: 2025-07-17